# Patient Record
Sex: MALE | HISPANIC OR LATINO
[De-identification: names, ages, dates, MRNs, and addresses within clinical notes are randomized per-mention and may not be internally consistent; named-entity substitution may affect disease eponyms.]

---

## 2023-08-23 PROBLEM — Z00.00 ENCOUNTER FOR PREVENTIVE HEALTH EXAMINATION: Status: ACTIVE | Noted: 2023-08-23

## 2023-09-07 ENCOUNTER — APPOINTMENT (OUTPATIENT)
Age: 18
End: 2023-09-07
Payer: MEDICAID

## 2023-09-07 VITALS
DIASTOLIC BLOOD PRESSURE: 66 MMHG | HEIGHT: 67 IN | HEART RATE: 86 BPM | SYSTOLIC BLOOD PRESSURE: 136 MMHG | OXYGEN SATURATION: 98 % | WEIGHT: 172 LBS | BODY MASS INDEX: 27 KG/M2

## 2023-09-07 DIAGNOSIS — Z78.9 OTHER SPECIFIED HEALTH STATUS: ICD-10-CM

## 2023-09-07 PROCEDURE — 99203 OFFICE O/P NEW LOW 30 MIN: CPT

## 2023-09-07 RX ORDER — DOXYCYCLINE HYCLATE 100 MG/1
100 CAPSULE ORAL
Qty: 60 | Refills: 0 | Status: ACTIVE | COMMUNITY
Start: 2023-09-07 | End: 1900-01-01

## 2023-09-07 NOTE — CONSULT LETTER
[Dear  ___] : Dear  [unfilled], [Consult Letter:] : I had the pleasure of evaluating your patient, [unfilled]. [Please see my note below.] : Please see my note below. [Consult Closing:] : Thank you very much for allowing me to participate in the care of this patient.  If you have any questions, please do not hesitate to contact me. [Sincerely,] : Sincerely, [FreeTextEntry3] : Kvng Pelaez MD, FACS

## 2023-09-07 NOTE — HISTORY OF PRESENT ILLNESS
[de-identified] : This is a 18 year  old patient who was referred by Dr. Dylon Diaz with the chief complaint of having BL axilla hidradenitis .  He reports having this condition on and off  for 5 years. He denies any trauma to the area.   He denies any fever or  night sweats. Appetite is good and weight is stable.  He  reports multiple courses of PO antibiotics 5 to 10 days courses.  He wants to know if it could  be surgically  removed.

## 2023-09-07 NOTE — PHYSICAL EXAM
[Alert] : alert [Oriented to Person] : oriented to person [Oriented to Place] : oriented to place [Oriented to Time] : oriented to time [Calm] : calm [de-identified] : He  is alert, well-groomed, and in NAD   [de-identified] : anicteric.  Nasal mucosa pink, septum midline. Oral mucosa pink.  Tongue midline, Pharynx without exudates.   [de-identified] : Neck supple. Trachea midline. Thyroid isthmus barely palpable, lobes not felt.   [de-identified] : BL axilla extensive hidradenitis

## 2023-09-07 NOTE — REVIEW OF SYSTEMS
[As Noted in HPI] : as noted in HPI [Skin Lesions] : skin lesion [Negative] : Heme/Lymph [de-identified] : BL axilla blisters

## 2023-09-07 NOTE — PLAN
[FreeTextEntry1] : Mr. RAJEEV PONCE  was told significance of findings, options, risks and benefits were explained.  He has BL axilla extensive hidradenitis.   All surgical options were discussed including non-surgical treatments.   surgery is not advised at this time as  the condition is so extensive it might require grafting. he was advised to take PO antibiotics for 1 month and follow up after for reassessment.  Patient advised to seek immediate medical attention with any acute change in symptoms or with the development of any new or worsening symptoms.  Patient's questions and concerns addressed to patient's satisfaction, and patient verbalized an understanding of the information discussed.

## 2023-10-16 ENCOUNTER — APPOINTMENT (OUTPATIENT)
Age: 18
End: 2023-10-16
Payer: MEDICAID

## 2023-10-16 VITALS
HEART RATE: 97 BPM | BODY MASS INDEX: 27.94 KG/M2 | SYSTOLIC BLOOD PRESSURE: 155 MMHG | OXYGEN SATURATION: 97 % | WEIGHT: 178 LBS | DIASTOLIC BLOOD PRESSURE: 87 MMHG | HEIGHT: 67 IN

## 2023-10-16 PROCEDURE — 99213 OFFICE O/P EST LOW 20 MIN: CPT

## 2023-10-16 RX ORDER — DOXYCYCLINE HYCLATE 100 MG/1
100 CAPSULE ORAL
Qty: 60 | Refills: 0 | Status: ACTIVE | COMMUNITY
Start: 2023-10-16 | End: 1900-01-01

## 2023-10-31 ENCOUNTER — NON-APPOINTMENT (OUTPATIENT)
Age: 18
End: 2023-10-31

## 2023-11-13 ENCOUNTER — APPOINTMENT (OUTPATIENT)
Dept: SURGERY | Facility: CLINIC | Age: 18
End: 2023-11-13
Payer: MEDICAID

## 2023-11-13 VITALS
HEART RATE: 83 BPM | SYSTOLIC BLOOD PRESSURE: 145 MMHG | BODY MASS INDEX: 27.78 KG/M2 | DIASTOLIC BLOOD PRESSURE: 75 MMHG | HEIGHT: 67 IN | OXYGEN SATURATION: 98 % | WEIGHT: 177 LBS

## 2023-11-13 DIAGNOSIS — L73.2 HIDRADENITIS SUPPURATIVA: ICD-10-CM

## 2023-11-13 PROCEDURE — 99213 OFFICE O/P EST LOW 20 MIN: CPT

## 2024-01-04 ENCOUNTER — APPOINTMENT (OUTPATIENT)
Dept: DERMATOLOGY | Facility: CLINIC | Age: 19
End: 2024-01-04

## 2024-04-04 ENCOUNTER — APPOINTMENT (OUTPATIENT)
Dept: DERMATOLOGY | Facility: CLINIC | Age: 19
End: 2024-04-04
Payer: MEDICAID

## 2024-04-04 PROCEDURE — G0444 DEPRESSION SCREEN ANNUAL: CPT | Mod: 59

## 2024-04-04 PROCEDURE — 99204 OFFICE O/P NEW MOD 45 MIN: CPT | Mod: 25

## 2024-04-08 ENCOUNTER — NON-APPOINTMENT (OUTPATIENT)
Age: 19
End: 2024-04-08

## 2024-04-08 RX ORDER — ADALIMUMAB 80MG/0.8ML
80 KIT SUBCUTANEOUS
Qty: 1 | Refills: 0 | Status: ACTIVE | COMMUNITY
Start: 2024-04-04 | End: 1900-01-01

## 2024-04-08 RX ORDER — ADALIMUMAB 40MG/0.4ML
40 KIT SUBCUTANEOUS
Qty: 2 | Refills: 4 | Status: ACTIVE | COMMUNITY
Start: 2024-04-04 | End: 1900-01-01

## 2024-04-09 ENCOUNTER — APPOINTMENT (OUTPATIENT)
Dept: INTERNAL MEDICINE | Facility: CLINIC | Age: 19
End: 2024-04-09

## 2024-04-09 ENCOUNTER — OUTPATIENT (OUTPATIENT)
Dept: OUTPATIENT SERVICES | Facility: HOSPITAL | Age: 19
LOS: 1 days | End: 2024-04-09

## 2024-04-09 ENCOUNTER — NON-APPOINTMENT (OUTPATIENT)
Age: 19
End: 2024-04-09

## 2024-04-16 LAB
ALBUMIN SERPL ELPH-MCNC: 4.5 G/DL
ALP BLD-CCNC: 82 U/L
ALT SERPL-CCNC: 26 U/L
ANION GAP SERPL CALC-SCNC: 15 MMOL/L
AST SERPL-CCNC: 19 U/L
BASOPHILS # BLD AUTO: 0.03 K/UL
BASOPHILS NFR BLD AUTO: 0.4 %
BILIRUB SERPL-MCNC: 0.5 MG/DL
BUN SERPL-MCNC: 12 MG/DL
CALCIUM SERPL-MCNC: 9.6 MG/DL
CHLORIDE SERPL-SCNC: 102 MMOL/L
CO2 SERPL-SCNC: 20 MMOL/L
CREAT SERPL-MCNC: 0.71 MG/DL
EGFR: 135 ML/MIN/1.73M2
EOSINOPHIL # BLD AUTO: 0.04 K/UL
EOSINOPHIL NFR BLD AUTO: 0.5 %
GLUCOSE SERPL-MCNC: 110 MG/DL
HAV IGM SER QL: NONREACTIVE
HBV CORE IGG+IGM SER QL: NONREACTIVE
HBV CORE IGM SER QL: NONREACTIVE
HBV SURFACE AB SER QL: NONREACTIVE
HBV SURFACE AG SER QL: NONREACTIVE
HBV SURFACE AG SER QL: NONREACTIVE
HCT VFR BLD CALC: 46.7 %
HCV AB SER QL: NONREACTIVE
HCV AB SER QL: NONREACTIVE
HCV S/CO RATIO: 0.13 S/CO
HCV S/CO RATIO: 0.14 S/CO
HGB BLD-MCNC: 16.4 G/DL
IMM GRANULOCYTES NFR BLD AUTO: 0.1 %
LYMPHOCYTES # BLD AUTO: 1.64 K/UL
LYMPHOCYTES NFR BLD AUTO: 21.5 %
M TB IFN-G BLD-IMP: NEGATIVE
MAN DIFF?: NORMAL
MCHC RBC-ENTMCNC: 30.9 PG
MCHC RBC-ENTMCNC: 35.1 GM/DL
MCV RBC AUTO: 88.1 FL
MONOCYTES # BLD AUTO: 0.53 K/UL
MONOCYTES NFR BLD AUTO: 6.9 %
NEUTROPHILS # BLD AUTO: 5.38 K/UL
NEUTROPHILS NFR BLD AUTO: 70.6 %
PLATELET # BLD AUTO: 249 K/UL
POTASSIUM SERPL-SCNC: 4.1 MMOL/L
PROT SERPL-MCNC: 8.8 G/DL
QUANTIFERON TB PLUS MITOGEN MINUS NIL: 1.28 IU/ML
QUANTIFERON TB PLUS NIL: 0.04 IU/ML
QUANTIFERON TB PLUS TB1 MINUS NIL: 0.01 IU/ML
QUANTIFERON TB PLUS TB2 MINUS NIL: 0.02 IU/ML
RBC # BLD: 5.3 M/UL
RBC # FLD: 12.7 %
SODIUM SERPL-SCNC: 137 MMOL/L
WBC # FLD AUTO: 7.63 K/UL

## 2024-04-23 ENCOUNTER — APPOINTMENT (OUTPATIENT)
Dept: DERMATOLOGY | Facility: CLINIC | Age: 19
End: 2024-04-23
Payer: MEDICAID

## 2024-04-23 DIAGNOSIS — L73.2 HIDRADENITIS SUPPURATIVA: ICD-10-CM

## 2024-04-23 DIAGNOSIS — Z51.81 ENCOUNTER FOR THERAPEUTIC DRUG LVL MONITORING: ICD-10-CM

## 2024-04-23 PROCEDURE — 96401 CHEMO ANTI-NEOPL SQ/IM: CPT

## 2024-04-23 PROCEDURE — 99214 OFFICE O/P EST MOD 30 MIN: CPT | Mod: 25

## 2024-04-25 NOTE — HISTORY OF PRESENT ILLNESS
[FreeTextEntry1] : f/u HS [de-identified] : 18yoM here for follow up and injection training  #HS x 5 years Previously tried doxycycline w/o improvement. Seen by surgery, who declined surgery (due to extensive site that may require grafting)  mostly in axilla w/ frequent drainage here to start Humira [TextBox_20] : 04- [TextBox_22] : 0

## 2024-04-25 NOTE — PHYSICAL EXAM
[Alert] : alert [Oriented x 3] : ~L oriented x 3 [Well Nourished] : well nourished [Conjunctiva Non-injected] : conjunctiva non-injected [No Visual Lymphadenopathy] : no visual  lymphadenopathy [No Clubbing] : no clubbing [No Edema] : no edema [No Bromhidrosis] : no bromhidrosis [No Chromhidrosis] : no chromhidrosis [FreeTextEntry3] : scarred nodules and draining sinus tracts in R>L axilla few erosions on L scrotum

## 2024-04-25 NOTE — ASSESSMENT
[FreeTextEntry1] : #Hidradenitis Suppurativa--Chronic, flaring Justice Stage 3 We have discussed the nature and course of this condition. I have discussed the goals of therapy with the patient. We have discussed treatment options and expectations from treatment. Discussed medical management w/ biologic therapy and wide local excision. Will start Humira as below, and consider surgery in the future  - Injected Humira 80MG x2 into L and R thigh - Pt instructed on proper injection technique, was able to inject successfully today  #Medication monitoring  - CBC, CMP, hepatitis studies, quant gold 4/4/24 wnl

## 2024-05-28 ENCOUNTER — APPOINTMENT (OUTPATIENT)
Dept: DERMATOLOGY | Facility: CLINIC | Age: 19
End: 2024-05-28

## 2024-07-23 ENCOUNTER — APPOINTMENT (OUTPATIENT)
Dept: DERMATOLOGY | Facility: CLINIC | Age: 19
End: 2024-07-23
Payer: MEDICAID

## 2024-07-23 VITALS — BODY MASS INDEX: 28.14 KG/M2 | HEIGHT: 69 IN | WEIGHT: 190 LBS

## 2024-07-23 DIAGNOSIS — L05.91 PILONIDAL CYST W/OUT ABSCESS: ICD-10-CM

## 2024-07-23 DIAGNOSIS — L73.2 HIDRADENITIS SUPPURATIVA: ICD-10-CM

## 2024-07-23 DIAGNOSIS — Z51.81 ENCOUNTER FOR THERAPEUTIC DRUG LVL MONITORING: ICD-10-CM

## 2024-07-23 PROCEDURE — 99214 OFFICE O/P EST MOD 30 MIN: CPT

## 2024-07-30 NOTE — HISTORY OF PRESENT ILLNESS
[FreeTextEntry1] : f/u HS [de-identified] : 19yoM here for follow up on Humira  #HS x 5 years LV 4/2024, started Humira, currently dosing weekly. Tolerating well, feels he has appreciated 60% improvement in his HS. Still flaring periodically in the axillae (R drainage > L.)  HX: Previously tried doxycycline w/o improvement. Seen by surgery, who declined surgery (due to extensive site that may require grafting)  mostly in axilla w/ frequent drainage  [TextBox_20] : 04- [TextBox_22] : 0

## 2024-07-30 NOTE — ASSESSMENT
[FreeTextEntry1] : #Hidradenitis Suppurativa--Chronic, improved, not at treatment goal Justice Stage 3 We have discussed the nature and course of this condition. I have discussed the goals of therapy with the patient. We have discussed treatment options and expectations from treatment. Discussed medical management w/ biologic therapy and wide local excision. Will start Humira as below, and consider surgery in the future  Discussed in more detail options for continuing Humira given improvement vs switch to more potent TNFi such as inflximab vs other class of biologic approved for HS, IL-17i Cosentyx. - Recommend continue with Humira 40 mg weekly given he is a responder with good improvement but not at treatment goal. Discussed reasonable to continue with Humira for 3 more months with the goal of optimizing his improvement. At that time if inflammation further improved can consider referral to general surgery for excision.    #Medication monitoring  - CBC, CMP, hepatitis studies, quant gold 4/4/24 wnl - repeat labs next visit   #Pilonidal cyst, chronic, flaring with drainage Discussed clinical diagnosis given presenting symptoms and presentation. We have discussed the nature and course of this condition. Discussed commonly seen in association with HS For these lesions, recommend evaluation with general surgery for excision  Referral and contact information for Dr. Michael Perez,  RTC 3 months

## 2024-07-30 NOTE — HISTORY OF PRESENT ILLNESS
[FreeTextEntry1] : f/u HS [de-identified] : 19yoM here for follow up on Humira  #HS x 5 years LV 4/2024, started Humira, currently dosing weekly. Tolerating well, feels he has appreciated 60% improvement in his HS. Still flaring periodically in the axillae (R drainage > L.)  HX: Previously tried doxycycline w/o improvement. Seen by surgery, who declined surgery (due to extensive site that may require grafting)  mostly in axilla w/ frequent drainage  [TextBox_20] : 04- [TextBox_22] : 0

## 2024-07-30 NOTE — PHYSICAL EXAM
[Alert] : alert [Oriented x 3] : ~L oriented x 3 [Well Nourished] : well nourished [Conjunctiva Non-injected] : conjunctiva non-injected [No Visual Lymphadenopathy] : no visual  lymphadenopathy [No Clubbing] : no clubbing [No Edema] : no edema [No Bromhidrosis] : no bromhidrosis [No Chromhidrosis] : no chromhidrosis [FreeTextEntry3] : scarred nodules and non-draining sinus tracts in R>L axilla few erosions on L & R scrotum  intergluteal cleft, superior region with exophytic inflammatory papule with scant drainage.

## 2024-10-10 RX ORDER — ADALIMUMAB 40MG/0.8ML
KIT SUBCUTANEOUS
Qty: 1 | Refills: 0 | Status: DISCONTINUED | COMMUNITY
Start: 2024-10-09 | End: 2024-10-10

## 2024-10-15 RX ORDER — ADALIMUMAB 40MG/0.4ML
40 KIT SUBCUTANEOUS
Qty: 2 | Refills: 0 | Status: ACTIVE | COMMUNITY
Start: 2024-10-10

## 2024-10-24 ENCOUNTER — APPOINTMENT (OUTPATIENT)
Dept: DERMATOLOGY | Facility: CLINIC | Age: 19
End: 2024-10-24